# Patient Record
Sex: FEMALE | Race: WHITE | Employment: OTHER | URBAN - METROPOLITAN AREA
[De-identification: names, ages, dates, MRNs, and addresses within clinical notes are randomized per-mention and may not be internally consistent; named-entity substitution may affect disease eponyms.]

---

## 2018-04-07 ENCOUNTER — HOSPITAL ENCOUNTER (OUTPATIENT)
Dept: MRI IMAGING | Age: 68
Discharge: HOME | End: 2018-04-07
Attending: FAMILY MEDICINE
Payer: MEDICARE

## 2018-04-07 DIAGNOSIS — M47.12 OTHER SPONDYLOSIS WITH MYELOPATHY, CERVICAL REGION: ICD-10-CM

## 2018-04-07 PROCEDURE — 72141 MRI NECK SPINE W/O DYE: CPT

## 2022-06-10 RX ORDER — METFORMIN HYDROCHLORIDE 500 MG/1
500 TABLET, EXTENDED RELEASE ORAL 2 TIMES DAILY
COMMUNITY

## 2022-06-16 ENCOUNTER — ANESTHESIA EVENT (OUTPATIENT)
Dept: ENDOSCOPY | Age: 72
End: 2022-06-16
Payer: MEDICARE

## 2022-06-17 ENCOUNTER — HOSPITAL ENCOUNTER (OUTPATIENT)
Age: 72
Setting detail: OUTPATIENT SURGERY
Discharge: HOME | End: 2022-06-17
Attending: INTERNAL MEDICINE | Admitting: INTERNAL MEDICINE
Payer: MEDICARE

## 2022-06-17 ENCOUNTER — ANESTHESIA (OUTPATIENT)
Dept: ENDOSCOPY | Age: 72
End: 2022-06-17
Payer: MEDICARE

## 2022-06-17 VITALS
RESPIRATION RATE: 16 BRPM | WEIGHT: 206 LBS | OXYGEN SATURATION: 98 % | SYSTOLIC BLOOD PRESSURE: 133 MMHG | TEMPERATURE: 98.6 F | HEIGHT: 63 IN | DIASTOLIC BLOOD PRESSURE: 60 MMHG | HEART RATE: 59 BPM | BODY MASS INDEX: 36.5 KG/M2

## 2022-06-17 PROCEDURE — 88305 TISSUE EXAM BY PATHOLOGIST: CPT

## 2022-06-17 PROCEDURE — 76040000007: Performed by: INTERNAL MEDICINE

## 2022-06-17 PROCEDURE — 88342 IMHCHEM/IMCYTCHM 1ST ANTB: CPT

## 2022-06-17 PROCEDURE — 77030021593 HC FCPS BIOP ENDOSC BSC -A: Performed by: INTERNAL MEDICINE

## 2022-06-17 PROCEDURE — 74011000250 HC RX REV CODE- 250: Performed by: ANESTHESIOLOGY

## 2022-06-17 PROCEDURE — 2709999900 HC NON-CHARGEABLE SUPPLY: Performed by: INTERNAL MEDICINE

## 2022-06-17 PROCEDURE — 74011250636 HC RX REV CODE- 250/636: Performed by: ANESTHESIOLOGY

## 2022-06-17 PROCEDURE — 77030038604 HC SNR ENDO EXACTO USEN -B: Performed by: INTERNAL MEDICINE

## 2022-06-17 PROCEDURE — 76060000032 HC ANESTHESIA 0.5 TO 1 HR: Performed by: INTERNAL MEDICINE

## 2022-06-17 RX ORDER — SODIUM CHLORIDE 9 MG/ML
75 INJECTION, SOLUTION INTRAVENOUS CONTINUOUS
Status: DISCONTINUED | OUTPATIENT
Start: 2022-06-17 | End: 2022-06-17 | Stop reason: HOSPADM

## 2022-06-17 RX ORDER — FENTANYL CITRATE 50 UG/ML
12.5-2 INJECTION, SOLUTION INTRAMUSCULAR; INTRAVENOUS
Status: DISCONTINUED | OUTPATIENT
Start: 2022-06-17 | End: 2022-06-17 | Stop reason: HOSPADM

## 2022-06-17 RX ORDER — LIDOCAINE HYDROCHLORIDE 20 MG/ML
INJECTION, SOLUTION EPIDURAL; INFILTRATION; INTRACAUDAL; PERINEURAL AS NEEDED
Status: DISCONTINUED | OUTPATIENT
Start: 2022-06-17 | End: 2022-06-17 | Stop reason: HOSPADM

## 2022-06-17 RX ORDER — MIDAZOLAM HYDROCHLORIDE 1 MG/ML
.25-5 INJECTION, SOLUTION INTRAMUSCULAR; INTRAVENOUS
Status: DISCONTINUED | OUTPATIENT
Start: 2022-06-17 | End: 2022-06-17 | Stop reason: HOSPADM

## 2022-06-17 RX ORDER — OMEPRAZOLE 40 MG/1
40 CAPSULE, DELAYED RELEASE ORAL
Qty: 30 CAPSULE | Refills: 3 | Status: SHIPPED | OUTPATIENT
Start: 2022-06-17

## 2022-06-17 RX ORDER — SODIUM CHLORIDE 9 MG/ML
INJECTION, SOLUTION INTRAVENOUS
Status: DISCONTINUED | OUTPATIENT
Start: 2022-06-17 | End: 2022-06-17 | Stop reason: HOSPADM

## 2022-06-17 RX ORDER — PHENYLEPHRINE HCL IN 0.9% NACL 0.4MG/10ML
SYRINGE (ML) INTRAVENOUS AS NEEDED
Status: DISCONTINUED | OUTPATIENT
Start: 2022-06-17 | End: 2022-06-17 | Stop reason: HOSPADM

## 2022-06-17 RX ORDER — NALOXONE HYDROCHLORIDE 0.4 MG/ML
0.4 INJECTION, SOLUTION INTRAMUSCULAR; INTRAVENOUS; SUBCUTANEOUS
Status: DISCONTINUED | OUTPATIENT
Start: 2022-06-17 | End: 2022-06-17 | Stop reason: HOSPADM

## 2022-06-17 RX ORDER — EPINEPHRINE 0.1 MG/ML
1 INJECTION INTRACARDIAC; INTRAVENOUS
Status: DISCONTINUED | OUTPATIENT
Start: 2022-06-17 | End: 2022-06-17 | Stop reason: HOSPADM

## 2022-06-17 RX ORDER — PROPOFOL 10 MG/ML
INJECTION, EMULSION INTRAVENOUS AS NEEDED
Status: DISCONTINUED | OUTPATIENT
Start: 2022-06-17 | End: 2022-06-17 | Stop reason: HOSPADM

## 2022-06-17 RX ORDER — DEXTROMETHORPHAN/PSEUDOEPHED 2.5-7.5/.8
1.2 DROPS ORAL
Status: DISCONTINUED | OUTPATIENT
Start: 2022-06-17 | End: 2022-06-17 | Stop reason: HOSPADM

## 2022-06-17 RX ORDER — SODIUM CHLORIDE 0.9 % (FLUSH) 0.9 %
5-40 SYRINGE (ML) INJECTION AS NEEDED
Status: DISCONTINUED | OUTPATIENT
Start: 2022-06-17 | End: 2022-06-17 | Stop reason: HOSPADM

## 2022-06-17 RX ORDER — FLUMAZENIL 0.1 MG/ML
0.2 INJECTION INTRAVENOUS
Status: DISCONTINUED | OUTPATIENT
Start: 2022-06-17 | End: 2022-06-17 | Stop reason: HOSPADM

## 2022-06-17 RX ORDER — SODIUM CHLORIDE 0.9 % (FLUSH) 0.9 %
5-40 SYRINGE (ML) INJECTION EVERY 8 HOURS
Status: DISCONTINUED | OUTPATIENT
Start: 2022-06-17 | End: 2022-06-17 | Stop reason: HOSPADM

## 2022-06-17 RX ORDER — ATROPINE SULFATE 0.1 MG/ML
0.5 INJECTION INTRAVENOUS
Status: DISCONTINUED | OUTPATIENT
Start: 2022-06-17 | End: 2022-06-17 | Stop reason: HOSPADM

## 2022-06-17 RX ADMIN — PROPOFOL 80 MG: 10 INJECTION, EMULSION INTRAVENOUS at 14:51

## 2022-06-17 RX ADMIN — Medication 120 MCG: at 14:52

## 2022-06-17 RX ADMIN — PROPOFOL 25 MG: 10 INJECTION, EMULSION INTRAVENOUS at 15:12

## 2022-06-17 RX ADMIN — PROPOFOL 25 MG: 10 INJECTION, EMULSION INTRAVENOUS at 15:00

## 2022-06-17 RX ADMIN — PROPOFOL 25 MG: 10 INJECTION, EMULSION INTRAVENOUS at 15:16

## 2022-06-17 RX ADMIN — LIDOCAINE HYDROCHLORIDE 40 MG: 20 INJECTION, SOLUTION EPIDURAL; INFILTRATION; INTRACAUDAL; PERINEURAL at 14:51

## 2022-06-17 RX ADMIN — PROPOFOL 25 MG: 10 INJECTION, EMULSION INTRAVENOUS at 14:58

## 2022-06-17 RX ADMIN — PROPOFOL 25 MG: 10 INJECTION, EMULSION INTRAVENOUS at 15:04

## 2022-06-17 RX ADMIN — SODIUM CHLORIDE: 9 INJECTION, SOLUTION INTRAVENOUS at 14:48

## 2022-06-17 RX ADMIN — PROPOFOL 25 MG: 10 INJECTION, EMULSION INTRAVENOUS at 14:56

## 2022-06-17 RX ADMIN — PROPOFOL 25 MG: 10 INJECTION, EMULSION INTRAVENOUS at 15:18

## 2022-06-17 RX ADMIN — PROPOFOL 30 MG: 10 INJECTION, EMULSION INTRAVENOUS at 14:54

## 2022-06-17 RX ADMIN — PROPOFOL 40 MG: 10 INJECTION, EMULSION INTRAVENOUS at 14:52

## 2022-06-17 RX ADMIN — PROPOFOL 25 MG: 10 INJECTION, EMULSION INTRAVENOUS at 15:14

## 2022-06-17 RX ADMIN — PROPOFOL 25 MG: 10 INJECTION, EMULSION INTRAVENOUS at 15:09

## 2022-06-17 RX ADMIN — PROPOFOL 25 MG: 10 INJECTION, EMULSION INTRAVENOUS at 15:02

## 2022-06-17 RX ADMIN — PROPOFOL 25 MG: 10 INJECTION, EMULSION INTRAVENOUS at 15:07

## 2022-06-17 RX ADMIN — PROPOFOL 25 MG: 10 INJECTION, EMULSION INTRAVENOUS at 15:06

## 2022-06-17 NOTE — PROCEDURES
[address]  [address]  [phone]               Colonoscopy Operative Report      Indications:  Iron deficiency anemia    :  Barbara Avalos MD    Staff: Endoscopy Technician-1: Penelope Trotter  Endoscopy RN-1: Brad Spann RN     Referring Provider: Jo Ann Patiño MD    Sedation:  MAC anesthesia    Procedure Details:  After informed consent was obtained with all risks and benefits of procedure explained and preoperative exam completed, the patient was taken to the endoscopy suite and placed in the left lateral decubitus position. Upon sequential sedation as per above, a digital rectal exam was performed  And was normal.  The Olympus videocolonoscope  was inserted in the rectum and carefully advanced to the cecum, which was identified by the ileocecal valve and appendiceal orifice, terminal ileum. The quality of preparation was good. The colonoscope was slowly withdrawn with careful evaluation between folds. Retroflexion in the rectum was performed and was normal..     Findings:   Rectum: Grade 2 internal hemorrhoid(s); Sigmoid: normal  Descending Colon: 1  Sessile polyp(s), the largest 7 mm in size;  Transverse Colon: normal  Ascending Colon: 1  Sessile polyp(s), the largest 3 mm in size;  Cecum: normal  Terminal Ileum: normal    Interventions:  2 complete polypectomy were performed using cold snare and cold biopsy forceps and the polyps were  retrieved    Specimen Removed:   ID Type Source Tests Collected by Time Destination   3 : Ascending colon polyp Preservative Colon, Ascending  Poli Goodson MD 6/17/2022 1516 Pathology   4 : Descending colon polyp Preservative Colon, Descending  Poli Goodson MD 6/17/2022 1519 Pathology       EBL:  Minimal    Complications:  None; patient tolerated the procedure well.     Impression:  -Two polyps (3-7 mm) found in the ascending and descending colon, removed and sent for pathology  -Medium sized internal hemorrhoids  -Otherwise normal colon and terminal ileum    Recommendations:   -Resume normal medication(s). -Begin fiber supplement daily   -High fiber diet.  -Await pathology. -If adenoma is present, repeat colonoscopy in 5 years.  -Call my office to schedule follow up in 4-6 months and if persistent anemia will consider capsule endoscopy. Discharge Disposition:  Home in the company of a  when able to ambulate.     Moni Lopez MD  6/17/2022  3:27 PM

## 2022-06-17 NOTE — H&P
[address]  [phone]        History and Physical     NAME: Letty Schroeder   :  1950   MRN:  [id number]         HPI:  Letty Schroeder is a 81 y.o. female here for EGD and colonoscopy for iron deficiency anemia and for colorectal cancer screening. Last colonoscopy . Past Surgical History:   Procedure Laterality Date    HX GI      colonoscopy     Past Medical History:   Diagnosis Date    Arrhythmia     irregular heartbeat/PCP - states it is \"fine\"    Arthritis     Diabetes ([address]     GERD (gastroesophageal reflux disease)     Hypertension      Social History     Tobacco Use    Smoking status: Former Smoker    Smokeless tobacco: Never Used    Tobacco comment: quit 15-20 yrs ago   Vaping Use    Vaping Use: Never used   Substance Use Topics    Alcohol use: Yes     Comment: social    Drug use: Not on file     No current facility-administered medications on file prior to encounter. Current Outpatient Medications on File Prior to Encounter   Medication Sig Dispense Refill    iron/vitamin B complex (GERITOL PO) Take  by mouth as needed. liquid      metFORMIN ER (GLUCOPHAGE XR) 500 mg tablet Take 500 mg by mouth two (2) times a day. Pt reports not always taking it twice a day, but it is prescribed for twice a day.  IRBESARTAN-HYDROCHLOROTHIAZIDE PO Take  by mouth daily. Will bring in strength.        No Known Allergies  Family History   Problem Relation Age of Onset    Diabetes Mother     Hypertension Mother     Diabetes Father     Hypertension Father      Current Facility-Administered Medications   Medication Dose Route Frequency    0.9% sodium chloride infusion  75 mL/hr IntraVENous CONTINUOUS    sodium chloride (NS) flush 5-40 mL  5-40 mL IntraVENous Q8H    sodium chloride (NS) flush 5-40 mL  5-40 mL IntraVENous PRN    midazolam (VERSED) injection 0.25-5 mg  0.25-5 mg IntraVENous Multiple    fentaNYL citrate (PF) injection 12.5-200 mcg  12.5-200 mcg IntraVENous Multiple    naloxone (NARCAN) injection 0.4 mg  0.4 mg IntraVENous Multiple    flumazeniL (ROMAZICON) 0.1 mg/mL injection 0.2 mg  0.2 mg IntraVENous Multiple    simethicone (MYLICON) 28TE/5.6EC oral drops 80 mg  1.2 mL Oral Multiple    atropine injection 0.5 mg  0.5 mg IntraVENous ONCE PRN    EPINEPHrine (ADRENALIN) 0.1 mg/mL syringe 1 mg  1 mg Endoscopically ONCE PRN       PHYSICAL EXAM:    BP (!) 146/64   Pulse 71   Temp 98.6 °F (37 °C)   Resp 17   Ht 5' 3\" (1.6 m)   Wt 93.4 kg (206 lb)   SpO2 100%   Breastfeeding Unknown   BMI 36.49 kg/m²      General: WD, WN. Alert, cooperative, no acute distress    HEENT: NC, Atraumatic. PERRLA, EOMI. Anicteric sclerae. Lungs:  CTA Bilaterally. No Wheezing/Rhonchi/Rales. Heart:  Regular  rhythm,  No murmur, No Rubs, No Gallops  Abdomen: Soft, Non distended, Non tender. +Bowel sounds, no HSM  Extremities: No c/c/e  Neurologic:  CN 2-12 gi, Alert and oriented X 3. No acute neurological distress   Psych:   Good insight. Not anxious nor agitated. Assessment:   · Iron deficiency anemia.  Last colonoscopy 2008    Plan:   · Endoscopic procedure: EGD and colonoscopy  · Anesthesia plan: Monitored Anesthesia Care

## 2022-06-17 NOTE — PROCEDURES
[address]  174 [facility], [address] [place] [address]  [phone]           Endoscopic Gastroduodenoscopy Procedure Note    Shahab Daniels  1950  [id number]    Indication: Iron deficiency anemia     : Delphina Klinefelter, MD    Staff: Endoscopy Technician-1: Mukul Bowen  Endoscopy RN-1: Villa Aguilar RN     Referring Provider:  Lady You MD      Anesthesia/Sedation:  MAC anesthesia      Procedure Details     After infomed consent was obtained for the procedure, with all risks and benefits of procedure explained the patient was taken to the endoscopy suite and placed in the left lateral decubitus position. Following sequential administration of sedation as per above, the endoscope was inserted into the mouth and advanced under direct vision to third portion of the duodenum. A careful inspection was made as the gastroscope was withdrawn, including a retroflexed view of the proximal stomach; findings and interventions are described below. Findings:   Esophagus: Mild esophagitis, otherwise unremarkable exam.  Stomach: 4 cm sliding hiatal hernia with Hill grade 3 flap valve. GE junction and z-line at 36 cm and diaphragmatic hiatus at 40 cm from incisors. Otherwise normal.   Duodenum/jejunum: normal    Therapies:  biopsy of stomach fundus, body, antrum  biopsy of duodenal bulb, second and third part    Specimens:   ID Type Source Tests Collected by Time Destination   1 : duodenal biopsies Preservative Duodenum  Silva Villarreal MD 6/17/2022 3118 Pathology   2 : gastric biopsies Preservative Gastric  Silva Villarreal MD 6/17/2022 5623 Pathology               EBL: Minimal    Complications:  None; patient tolerated the procedure well. Impression:      -4 cm sliding hiatal hernia with mild esophagitis  -Otherwise normal exam - gastric and duodenal biopsies obtained.      Recommendations:  -Acid suppression with omeprazole 40 mg once daily before breakfast for 2-3 months and as needed after that.    -Await pathology.  -Proceed with colonoscopy today    Liz Fernandez MD  6/17/2022  3:01 PM

## 2022-06-17 NOTE — ANESTHESIA POSTPROCEDURE EVALUATION
Procedure(s):  COLONOSCOPY  ESOPHAGOGASTRODUODENOSCOPY (EGD)  ESOPHAGOGASTRODUODENAL (EGD) BIOPSY  ENDOSCOPIC POLYPECTOMY. MAC    Anesthesia Post Evaluation        Patient location during evaluation: PACU  Note status: Adequate. Level of consciousness: responsive to verbal stimuli and sleepy but conscious  Pain management: satisfactory to patient  Airway patency: patent  Anesthetic complications: no  Cardiovascular status: acceptable  Respiratory status: acceptable  Hydration status: acceptable  Comments: +Post-Anesthesia Evaluation and Assessment    Patient: Tammy Olsen MRN: [id number]  SSN: [id number]   YOB: 1950  Age: 81 y.o. Sex: female      Cardiovascular Function/Vital Signs    BP (!) 145/63   Pulse 73   Temp 37 °C (98.6 °F)   Resp 12   Ht 5' 3\" (1.6 m)   Wt 93.4 kg (206 lb)   SpO2 98%   Breastfeeding Unknown   BMI 36.49 kg/m²     Patient is status post Procedure(s):  COLONOSCOPY  ESOPHAGOGASTRODUODENOSCOPY (EGD)  ESOPHAGOGASTRODUODENAL (EGD) BIOPSY  ENDOSCOPIC POLYPECTOMY. Nausea/Vomiting: Controlled. Postoperative hydration reviewed and adequate. Pain:  Pain Scale 1: Numeric (0 - 10) (06/17/22 1429)  Pain Intensity 1: 0 (06/17/22 1429)   Managed. Neurological Status: At baseline. Mental Status and Level of Consciousness: Arousable. Pulmonary Status:   O2 Device: None (06/17/22 1524)   Adequate oxygenation and airway patent. Complications related to anesthesia: None    Post-anesthesia assessment completed. No concerns. Signed By: Jess Morris MD    6/17/2022  Post anesthesia nausea and vomiting:  controlled      INITIAL Post-op Vital signs:   Vitals Value Taken Time   BP     Temp     Pulse 69 06/17/22 1528   Resp 21 06/17/22 1528   SpO2 100 % 06/17/22 1528   Vitals shown include unvalidated device data.

## 2022-06-17 NOTE — PERIOP NOTES

## 2022-06-17 NOTE — DISCHARGE INSTRUCTIONS
[facility].  [address] [place] [address]  [phone]                         DISCHARGE INSTRUCTIONS    Kari Lewis  [id number]  1950    DISCOMFORT:  Redness at IV site- apply warm compress to area; if redness or soreness persist- contact your physician  There may be a slight amount of blood passed from the rectum  Gaseous discomfort- walking, belching will help relieve any discomfort  You may not operate a vehicle for 12 hours  You may not  engage in an occupation involving machinery or appliances for rest of today  You may not  drink alcoholic beverages for at least 12 hours  Avoid making any critical decisions for at least 24 hour    DIET:   GERD/high fiber Diet    ACTIVITY  You may resume your normal daily activities   Spend the remainder of the day resting -  avoid any strenuous activity. CALL M.D. ANY SIGN OF   Increasing pain, nausea, vomiting  Abdominal distension (swelling)  New increased bleeding (oral or rectal)  Fever (chills)  Pain in chest area  Bloody discharge from nose or mouth  Shortness of breath      ENDOSCOPY FINDINGS:    Upper Endoscopy:    Impression:      -4 cm sliding hiatal hernia with mild esophagitis  -Otherwise normal exam - gastric and duodenal biopsies obtained.      Recommendations:  -Acid suppression with omeprazole 40 mg once daily before breakfast for 2-3 months and as needed after that. -Await pathology. Colonoscopy:    Impression:  -Two polyps (3-7 mm) found in the ascending and descending colon, removed and sent for pathology  -Medium sized internal hemorrhoids  -Otherwise normal colon and terminal ileum    Recommendations:   -Resume normal medication(s). -Begin fiber supplement daily   -High fiber diet.  -Await pathology. -If adenoma is present, repeat colonoscopy in 5 years.  -Call my office to schedule follow up in 4-6 months and if persistent anemia will consider capsule endoscopy.      Camron Mccann MD    Follow-up Instructions:   Call Dr. Dell Marquez for any questions or problems. If we took a biopsy please call the office within 2 weeks to discuss your pathology results. Telephone # [phone]    Patient Education        High-Fiber Diet: Care Instructions  Overview     A high-fiber diet may help you relieve constipation and feel less bloated. Your doctor and dietitian will help you make a high-fiber eating plan based on your personal needs. The plan will include the things you like to eat. It will also make sure that you get 25 to 35 grams of fiber a day. Before you make changes to the way you eat, be sure to talk with your doctor or dietitian. Follow-up care is a key part of your treatment and safety. Be sure to make and go to all appointments, and call your doctor if you are having problems. It's also a good idea to know your test results and keep a list of the medicines you take. How can you care for yourself at home? · You can increase how much fiber you get if you eat more of certain foods. These foods include:  ? Whole-grain breads and cereals. ? Fruits, such as pears, apples, and peaches. Eat the skins and peels if you can.  ? Vegetables, such as broccoli, cabbage, spinach, carrots, asparagus, and squash. ? Starchy vegetables. These include potatoes with skins, kidney beans, and lima beans. · Take a fiber supplement every day if your doctor recommends it. Examples are Benefiber, Citrucel, FiberCon, and Metamucil. Ask your doctor how much to take. · Drink plenty of fluids. If you have kidney, heart, or liver disease and have to limit fluids, talk with your doctor before you increase the amount of fluids you drink. Where can you learn more? Go to http://www.gray.com/  Enter F840 in the search box to learn more about \"High-Fiber Diet: Care Instructions. \"  Current as of: September 8, 2021               Content Version: 13.2  © 6872-1139 Healthwise, [employer].    Care instructions adapted under license by [employer] (which disclaims liability or warranty for this information). If you have questions about a medical condition or this instruction, always ask your healthcare professional. Darrin Padilla any warranty or liability for your use of this information. Learning About Foods That Are Good Sources of Fiber  What foods are high in fiber? The foods you eat contain nutrients, such as vitamins and minerals. Fiber is a nutrient. Your body needs the right amount to stay healthy and work as it should. You can use the list below to help you make choices about which foods to eat. Here are some examples of foods that are good sources of fiber. Fruits  · Apple  · Apricot  · Avocado  · Banana  · Blackberries  · Cherries  · Melon  · Pear  · Raspberries  Grains  · Amaranth  · Barley  · Bran cereal  · Farro  · Oat bran  · Oatmeal  · Quinoa  · Rice (brown or wild)  · Whole-grain bread  · Whole-grain English muffin  Protein foods  · Almonds  · Beans (black, kidney, navy, park)  · Jareth seeds  · Garbanzo beans  · Lentils  · Pumpkin seeds  · Split peas  · Sunflower seeds  Vegetables  · Artichoke  · Broccoli  · [place] sprouts  · Cabbage  · Carrots  · Cauliflower  · Eggplant  · Green peas  · Kale  · Pumpkin  · Sweet potato  · White potato  Work with your doctor to find out how much of this nutrient you need. Depending on your health, you may need more or less of it in your diet. Where can you learn more? Go to http://www.gray.com/  Enter F355 in the search box to learn more about \"Learning About Foods That Are Good Sources of Fiber. \"  Current as of: September 8, 2021               Content Version: 13.2  © 7327-7846 Healthwise, [employer]. Care instructions adapted under license by [employer] (which disclaims liability or warranty for this information).  If you have questions about a medical condition or this instruction, always ask your healthcare professional. [address] any warranty or liability for your use of this information. Esophagitis: Care Instructions  Your Care Instructions     Esophagitis (say \"ih-sof-uh-JY-tus\") is irritation of the esophagus, the tube that carries food from your throat to your stomach. Acid reflux is the most common cause of this condition. When you have reflux, stomach acid and juices flow upward. This can cause pain or a burning feeling in your chest. You may have a sore throat. It may be hard to swallow. Other causes of this condition include some medicines and supplements. Allergies or an infection can also cause it. Your doctor will ask about your symptoms and past health. He or she might do tests to find the cause of your symptoms. Treatment depends on what is causing the problem. Treatment might include changing your diet or taking medicine to relieve your symptoms. It might also include changing a medicine that is causing your symptoms. If you have reflux, medicine that reduces the stomach acid helps your body heal. It might take 1 to 3 weeks to heal.  Follow-up care is a key part of your treatment and safety. Be sure to make and go to all appointments, and call your doctor if you are having problems. It's also a good idea to know your test results and keep a list of the medicines you take. How can you care for yourself at home? · If you have acid reflux, your doctor may recommend that you:  ? Eat several small meals instead of two or three large meals. After you eat, wait 2 to 3 hours before you lie down. ? Avoid chocolate, mint, alcohol, and spicy foods. ? Don't smoke or use smokeless tobacco. Smoking can make this condition worse. If you need help quitting, talk to your doctor about stop-smoking programs and medicines. These can increase your chances of quitting for good.   ? Raise the head of your bed 6 to 8 inches if you have symptoms at night. ? Lose weight if you are overweight. ? Take an over-the-counter antacid, such as Maalox, Mylanta, or Tums. Be careful when you take over-the-counter antacid medicines. Many of these medicines have aspirin in them. Read the label to make sure that you are not taking more than the recommended dose. Too much aspirin can be harmful. ? Take stronger acid reducers. Examples are famotidine (such as Pepcid) and omeprazole (such as Prilosec). · If your condition is caused by infection, allergy, or other problems, use the medicine or treatments that your doctor recommends. · Be safe with medicines. Take your medicines exactly as prescribed. Call your doctor if you think you are having a problem with your medicine. When should you call for help? Call your doctor now or seek immediate medical care if:    · You have new or worse belly pain.     · You are vomiting. Watch closely for changes in your health, and be sure to contact your doctor if:    · You have new or worse symptoms of reflux.     · You have trouble or pain swallowing.     · You are losing weight.     · You do not get better as expected. Where can you learn more? Go to http://www.gray.com/  Enter N977 in the search box to learn more about \"Esophagitis: Care Instructions. \"  Current as of: September 8, 2021               Content Version: 13.2  © 8078-0818 Healthwise, Inc[employer]. Care instructions adapted under license by [employer] (which disclaims liability or warranty for this information). If you have questions about a medical condition or this instruction, always ask your healthcare professional. [place] [address] any warranty or liability for your use of this information. Hemorrhoids: Care Instructions  Overview     Hemorrhoids are swollen veins that develop in the anal canal. Bleeding during bowel movements, itching, and rectal pain are the most common symptoms. Hemorrhoids can be uncomfortable at times, but rarely are they a serious problem. Most of the time, you can treat them with simple changes to your diet and bowel habits. These changes include eating more fiber and not straining to pass stools. Most hemorrhoids don't need surgery or other treatment unless they are very large and painful or bleed a lot. Follow-up care is a key part of your treatment and safety. Be sure to make and go to all appointments, and call your doctor if you are having problems. It's also a good idea to know your test results and keep a list of the medicines you take. How can you care for yourself at home? · Sit in a few inches of warm water (sitz bath) 3 times a day and after bowel movements. The warm water helps with pain and itching. · Put ice on your anal area several times a day for 10 minutes at a time. Put a thin cloth between the ice and your skin. Follow this by placing a warm, wet towel on the area for another 10 to 20 minutes. · Take pain medicines exactly as directed. ? If the doctor gave you a prescription medicine for pain, take it as prescribed. ? If you are not taking a prescription pain medicine, ask your doctor if you can take an over-the-counter medicine. · Keep the anal area clean, but be gentle. Use water and a fragrance-free soap, or use baby wipes or medicated pads such as Tucks. · Wear cotton underwear and loose clothing to decrease moisture in the anal area. · Eat more fiber. Include foods such as whole-grain breads and cereals, raw vegetables, raw and dried fruits, and beans. · Drink plenty of fluids. If you have kidney, heart, or liver disease and have to limit fluids, talk with your doctor before you increase the amount of fluids you drink. · Use a stool softener that contains bran or psyllium. You can save money by buying bran or psyllium (available in bulk at most health food stores) and sprinkling it on foods or stirring it into fruit juice.  Or you can use a product such as Metamucil or Hydrocil. · Practice healthy bowel habits. ? Go to the bathroom as soon as you have the urge. ? Avoid straining to pass stools. Relax and give yourself time to let things happen naturally. ? Do not hold your breath while passing stools. ? Do not read while sitting on the toilet. Get off the toilet as soon as you have finished. · Take your medicines exactly as prescribed. Call your doctor if you think you are having a problem with your medicine. When should you call for help? Call 911 anytime you think you may need emergency care. For example, call if:    · You pass maroon or very bloody stools. Call your doctor now or seek immediate medical care if:    · You have increased pain.     · You have increased bleeding. Watch closely for changes in your health, and be sure to contact your doctor if:    · Your symptoms have not improved after 3 or 4 days. Where can you learn more? Go to http://www.mcgill.com/  Enter F228 in the search box to learn more about \"Hemorrhoids: Care Instructions. \"  Current as of: September 8, 2021               Content Version: 13.2  © 1819-4498 [employer] (PRN). Care instructions adapted under license by [employer] (which disclaims liability or warranty for this information). If you have questions about a medical condition or this instruction, always ask your healthcare professional. [place] [address] any warranty or liability for your use of this information. Hiatal Hernia: Care Instructions  Your Care Instructions  A hiatal hernia occurs when part of the stomach bulges into the chest cavity. A hiatal hernia may allow stomach acid and juices to back up into the esophagus (acid reflux). This can cause a feeling of burning, warmth, heat, or pain behind the breastbone.  This feeling may often occur after you eat, soon after you lie down, or when you bend forward, and it may come and go. You also may have a sour taste in your mouth. These symptoms are commonly known as heartburn or reflux. But not all hiatal hernias cause symptoms. Follow-up care is a key part of your treatment and safety. Be sure to make and go to all appointments, and call your doctor if you are having problems. It's also a good idea to know your test results and keep a list of the medicines you take. How can you care for yourself at home? · Take your medicines exactly as prescribed. Call your doctor if you think you are having a problem with your medicine. · Do not take aspirin or other nonsteroidal anti-inflammatory drugs (NSAIDs), such as ibuprofen (Advil, Motrin) or naproxen (Aleve), unless your doctor says it is okay. Ask your doctor what you can take for pain. · Your doctor may recommend over-the-counter medicine. For mild or occasional indigestion, antacids such as Tums, Gaviscon, Maalox, or Mylanta may help. Your doctor also may recommend over-the-counter acid reducers, such as famotidine (Pepcid AC), cimetidine (Tagamet HB), or omeprazole (Prilosec). Read and follow all instructions on the label. If you use these medicines often, talk with your doctor. · Change your eating habits. ? It's best to eat several small meals instead of two or three large meals. ? After you eat, wait 2 to 3 hours before you lie down. Late-night snacks aren't a good idea. ? Avoid foods that make your symptoms worse. These may include chocolate, mint, alcohol, pepper, spicy foods, high-fat foods, or drinks with caffeine in them, such as tea, coffee, ashley, or energy drinks. If your symptoms are worse after you eat a certain food, you may want to stop eating it to see if your symptoms get better. · Do not smoke or chew tobacco.  · If you get heartburn at night, raise the head of your bed 6 to 8 inches by putting the frame on blocks or placing a foam wedge under the head of your mattress.  (Adding extra pillows does not work.)  · Do not wear tight clothing around your middle. · Lose weight if you need to. Losing just 5 to 10 pounds can help. When should you call for help? Call your doctor now or seek immediate medical care if:    · You have new or worse belly pain.     · You are vomiting. Watch closely for changes in your health, and be sure to contact your doctor if:    · You have new or worse symptoms of indigestion.     · You have trouble or pain swallowing.     · You are losing weight.     · You do not get better as expected. Where can you learn more? Go to http://www.mcgill.com/  Enter T074 in the search box to learn more about \"Hiatal Hernia: Care Instructions. \"  Current as of: September 8, 2021               Content Version: 13.2  © 4405-7762 [employer]. Care instructions adapted under license by [employer] (which disclaims liability or warranty for this information). If you have questions about a medical condition or this instruction, always ask your healthcare professional. [place] [address] any warranty or liability for your use of this information. Learning About Coronavirus [phone])  Coronavirus [phone]): Overview  What is coronavirus (COVID-19)? The coronavirus disease (COVID-19) is caused by a virus. It is an illness that was first found in [place], [place], in December 2019. It has since spread worldwide. The virus can cause fever, cough, and trouble breathing. In severe cases, it can cause pneumonia and make it hard to breathe without help. It can cause death. Coronaviruses are a large group of viruses. They cause the common cold. They also cause more serious illnesses like Middle East respiratory syndrome (MERS) and severe acute respiratory syndrome (SARS). COVID-19 is caused by a novel coronavirus. That means it's a new type that has not been seen in people before.   This virus spreads person-to-person through droplets from coughing and sneezing. It can also spread when you are close to someone who is infected. And it can spread when you touch something that has the virus on it, such as a doorknob or a tabletop. What can you do to protect yourself from coronavirus (COVID-19)? The best way to protect yourself from getting sick is to:  · Avoid areas where there is an outbreak. · Avoid contact with people who may be infected. · Wash your hands often with soap or alcohol-based hand sanitizers. · Avoid crowds and try to stay at least 6 feet away from other people. · Wash your hands often, especially after you cough or sneeze. Use soap and water, and scrub for at least 20 seconds. If soap and water aren't available, use an alcohol-based hand . · Avoid touching your mouth, nose, and eyes. What can you do to avoid spreading the virus to others? To help avoid spreading the virus to others:  · Cover your mouth with a tissue when you cough or sneeze. Then throw the tissue in the trash. · Use a disinfectant to clean things that you touch often. · Stay home if you are sick or have been exposed to the virus. Don't go to school, work, or public areas. And don't use public transportation. · If you are sick:  ? Leave your home only if you need to get medical care. But call the doctor's office first so they know you're coming. And wear a face mask, if you have one.  ? If you have a face mask, wear it whenever you're around other people. It can help stop the spread of the virus when you cough or sneeze. ? Clean and disinfect your home every day. Use household  and disinfectant wipes or sprays. Take special care to clean things that you grab with your hands. These include doorknobs, remote controls, phones, and handles on your refrigerator and microwave. And don't forget countertops, tabletops, bathrooms, and computer keyboards. When to call for help  Call 911 anytime you think you may need emergency care.  For example, call if:  · You have severe trouble breathing. (You can't talk at all.)  · You have constant chest pain or pressure. · You are severely dizzy or lightheaded. · You are confused or can't think clearly. · Your face and lips have a blue color. · You pass out (lose consciousness) or are very hard to wake up. Call your doctor now if you develop symptoms such as:  · Shortness of breath. · Fever. · Cough. If you need to get care, call ahead to the doctor's office for instructions before you go. Make sure you wear a face mask, if you have one, to prevent exposing other people to the virus. Where can you get the latest information? The following health organizations are tracking and studying this virus. Their websites contain the most up-to-date information. Terri Costello also learn what to do if you think you may have been exposed to the virus. · U.S. Centers for Disease Control and Prevention (CDC): The CDC provides updated news about the disease and travel advice. The website also tells you how to prevent the spread of infection. www.cdc.gov  · World Health Organization [facility]): WHO offers information about the virus outbreaks. WHO also has travel advice. www.who.int  Current as of: April 1, 2020               Content Version: 12.4  © 1185-8352 Healthwise, Incorporated. Care instructions adapted under license by your healthcare professional. If you have questions about a medical condition or this instruction, always ask your healthcare professional. [address] any warranty or liability for your use of this information.

## 2023-05-25 RX ORDER — METFORMIN HYDROCHLORIDE 500 MG/1
500 TABLET, EXTENDED RELEASE ORAL 2 TIMES DAILY
COMMUNITY

## 2023-05-25 RX ORDER — OMEPRAZOLE 40 MG/1
40 CAPSULE, DELAYED RELEASE ORAL
COMMUNITY
Start: 2022-06-17

## 2023-12-05 NOTE — ANESTHESIA PREPROCEDURE EVALUATION
Group Topic: [employer]    Date: 12/5/2023  Start Time: 0930  End Time: 1015  Facilitators: Ai Guevara    Focus: Recreational Therapy- Independent Healthy Leisure  Number in attendance: 13'    Pt was invited to participate in independent healthy leisure group by engaging in a relaxing activity such as reading a book, journaling, drawing, or putting together a puzzle. Benefits of independent healthy leisure may include reduction of stress, relaxation, distraction, and overall satisfaction with life.    Method: Group  Attendance: Present  Participation: Active  Patient Response: Appropriate feedback and Attentive  Mood: Normal  Affect: Type: Euthymic (normal mood)             Range: Full (normal)             Congruency: Congruent             Stability: Stable  Behavior/Socialization: Appropriate to group and Cooperative  Thought Process: Focused  Task Performance: Follows directions  Patient Evaluation: Full Participation     Pt actively engaged in an independent healthy leisure activity during group. Pt remained appropriate and focused.  Ai Guevara, JOSE CARLOSS         Relevant Problems   No relevant active problems       Anesthetic History   No history of anesthetic complications            Review of Systems / Medical History  Patient summary reviewed, nursing notes reviewed and pertinent labs reviewed    Pulmonary                Comments: Former Smoker   Neuro/Psych   Within defined limits           Cardiovascular    Hypertension        Dysrhythmias       Exercise tolerance: >4 METS  Comments: No ECG on file   GI/Hepatic/Renal     GERD          Comments: Special screening for malignant neoplasms, colon (6/17/22) Endo/Other    Diabetes: type 2    Obesity and arthritis     Other Findings            Physical Exam    Airway  Mallampati: III  TM Distance: > 6 cm  Neck ROM: normal range of motion   Mouth opening: Normal     Cardiovascular  Regular rate and rhythm,  S1 and S2 normal,  no murmur, click, rub, or gallop             Dental    Dentition: Poor dentition  Comments: Multiple missing teeth, none loose.    Pulmonary  Breath sounds clear to auscultation               Abdominal  GI exam deferred       Other Findings            Anesthetic Plan    ASA: 2  Anesthesia type: MAC          Induction: Intravenous  Anesthetic plan and risks discussed with: Patient

## 2024-06-08 ENCOUNTER — HOSPITAL ENCOUNTER (EMERGENCY)
Facility: HOSPITAL | Age: 74
Discharge: HOME | End: 2024-06-08
Attending: STUDENT IN AN ORGANIZED HEALTH CARE EDUCATION/TRAINING PROGRAM
Payer: MEDICARE

## 2024-06-08 VITALS
HEART RATE: 85 BPM | HEIGHT: 63 IN | DIASTOLIC BLOOD PRESSURE: 63 MMHG | OXYGEN SATURATION: 99 % | TEMPERATURE: 98.5 F | WEIGHT: 198.41 LBS | BODY MASS INDEX: 35.16 KG/M2 | SYSTOLIC BLOOD PRESSURE: 140 MMHG | RESPIRATION RATE: 16 BRPM

## 2024-06-08 DIAGNOSIS — M25.50 POLYARTHRALGIA: Primary | ICD-10-CM

## 2024-06-08 LAB
ALBUMIN SERPL-MCNC: 3.4 G/DL (ref 3.5–5)
ALBUMIN/GLOB SERPL: 0.7 (ref 1.1–2.2)
ALP SERPL-CCNC: 110 U/L (ref 45–117)
ALT SERPL-CCNC: 23 U/L (ref 12–78)
AMMONIA PLAS-SCNC: 11 UMOL/L
ANION GAP SERPL CALC-SCNC: 8 MMOL/L (ref 5–15)
APAP SERPL-MCNC: <2 UG/ML (ref 10–30)
AST SERPL-CCNC: 15 U/L (ref 15–37)
BASOPHILS # BLD: 0 K/UL (ref 0–0.1)
BASOPHILS NFR BLD: 0 % (ref 0–1)
BILIRUB SERPL-MCNC: 0.5 MG/DL (ref 0.2–1)
BUN SERPL-MCNC: 13 MG/DL (ref 6–20)
BUN/CREAT SERPL: 13 (ref 12–20)
CALCIUM SERPL-MCNC: 9 MG/DL (ref 8.5–10.1)
CHLORIDE SERPL-SCNC: 106 MMOL/L (ref 97–108)
CO2 SERPL-SCNC: 28 MMOL/L (ref 21–32)
CREAT SERPL-MCNC: 0.98 MG/DL (ref 0.55–1.02)
DIFFERENTIAL METHOD BLD: ABNORMAL
EOSINOPHIL # BLD: 0.1 K/UL (ref 0–0.4)
EOSINOPHIL NFR BLD: 1 % (ref 0–7)
ERYTHROCYTE [DISTWIDTH] IN BLOOD BY AUTOMATED COUNT: 13.2 % (ref 11.5–14.5)
GLOBULIN SER CALC-MCNC: 4.7 G/DL (ref 2–4)
GLUCOSE SERPL-MCNC: 123 MG/DL (ref 65–100)
HCO3 BLDV-SCNC: 25.4 MMOL/L (ref 23–28)
HCT VFR BLD AUTO: 31.6 % (ref 35–47)
HGB BLD-MCNC: 10.4 G/DL (ref 11.5–16)
IMM GRANULOCYTES # BLD AUTO: 0 K/UL (ref 0–0.04)
IMM GRANULOCYTES NFR BLD AUTO: 0 % (ref 0–0.5)
INR PPP: 1 (ref 0.9–1.1)
LYMPHOCYTES # BLD: 1.7 K/UL (ref 0.8–3.5)
LYMPHOCYTES NFR BLD: 27 % (ref 12–49)
MCH RBC QN AUTO: 26.8 PG (ref 26–34)
MCHC RBC AUTO-ENTMCNC: 32.9 G/DL (ref 30–36.5)
MCV RBC AUTO: 81.4 FL (ref 80–99)
MONOCYTES # BLD: 0.4 K/UL (ref 0–1)
MONOCYTES NFR BLD: 6 % (ref 5–13)
NEUTS SEG # BLD: 4.2 K/UL (ref 1.8–8)
NEUTS SEG NFR BLD: 66 % (ref 32–75)
NRBC # BLD: 0 K/UL (ref 0–0.01)
NRBC BLD-RTO: 0 PER 100 WBC
PCO2 BLDV: 41 MMHG (ref 41–51)
PH BLDV: 7.4 (ref 7.32–7.42)
PLATELET # BLD AUTO: 283 K/UL (ref 150–400)
PMV BLD AUTO: 10.6 FL (ref 8.9–12.9)
PO2 BLDV: 34 MMHG (ref 25–40)
POTASSIUM SERPL-SCNC: 4 MMOL/L (ref 3.5–5.1)
PROT SERPL-MCNC: 8.1 G/DL (ref 6.4–8.2)
PROTHROMBIN TIME: 10.1 SEC (ref 9–11.1)
RBC # BLD AUTO: 3.88 M/UL (ref 3.8–5.2)
SALICYLATES SERPL-MCNC: <1.7 MG/DL (ref 2.8–20)
SAO2 % BLDV: 64.6 % (ref 65–88)
SERVICE CMNT-IMP: ABNORMAL
SODIUM SERPL-SCNC: 142 MMOL/L (ref 136–145)
SPECIMEN TYPE: ABNORMAL
WBC # BLD AUTO: 6.3 K/UL (ref 3.6–11)

## 2024-06-08 PROCEDURE — 82140 ASSAY OF AMMONIA: CPT

## 2024-06-08 PROCEDURE — 6370000000 HC RX 637 (ALT 250 FOR IP): Performed by: STUDENT IN AN ORGANIZED HEALTH CARE EDUCATION/TRAINING PROGRAM

## 2024-06-08 PROCEDURE — 82803 BLOOD GASES ANY COMBINATION: CPT

## 2024-06-08 PROCEDURE — 80179 DRUG ASSAY SALICYLATE: CPT

## 2024-06-08 PROCEDURE — 85610 PROTHROMBIN TIME: CPT

## 2024-06-08 PROCEDURE — 85025 COMPLETE CBC W/AUTO DIFF WBC: CPT

## 2024-06-08 PROCEDURE — 36415 COLL VENOUS BLD VENIPUNCTURE: CPT

## 2024-06-08 PROCEDURE — 80143 DRUG ASSAY ACETAMINOPHEN: CPT

## 2024-06-08 PROCEDURE — 80053 COMPREHEN METABOLIC PANEL: CPT

## 2024-06-08 PROCEDURE — 99283 EMERGENCY DEPT VISIT LOW MDM: CPT

## 2024-06-08 RX ORDER — OXYCODONE HYDROCHLORIDE 5 MG/1
5 TABLET ORAL
Status: COMPLETED | OUTPATIENT
Start: 2024-06-08 | End: 2024-06-08

## 2024-06-08 RX ORDER — LIDOCAINE 4 G/G
1 PATCH TOPICAL DAILY
Status: DISCONTINUED | OUTPATIENT
Start: 2024-06-08 | End: 2024-06-08 | Stop reason: HOSPADM

## 2024-06-08 RX ADMIN — OXYCODONE HYDROCHLORIDE 5 MG: 5 TABLET ORAL at 10:31

## 2024-06-08 ASSESSMENT — PAIN DESCRIPTION - LOCATION: LOCATION: GENERALIZED

## 2024-06-08 ASSESSMENT — PAIN DESCRIPTION - PAIN TYPE: TYPE: CHRONIC PAIN

## 2024-06-08 ASSESSMENT — PAIN - FUNCTIONAL ASSESSMENT: PAIN_FUNCTIONAL_ASSESSMENT: 0-10

## 2024-06-08 ASSESSMENT — PAIN DESCRIPTION - DESCRIPTORS: DESCRIPTORS: ACHING

## 2024-06-08 ASSESSMENT — PAIN SCALES - GENERAL: PAINLEVEL_OUTOF10: 8

## 2024-06-08 NOTE — ED PROVIDER NOTES
DIAGNOSTIC RESULTS     EKG: All EKG's are interpreted by the Emergency Department Physician who either signs or Co-signs this chart in the absence of a cardiologist.        RADIOLOGY:   Non-plain film images such as CT, Ultrasound and MRI are read by the radiologist. Plain radiographic images are visualized and preliminarily interpreted by the emergency physician with the below findings:        Interpretation per the Radiologist below, if available at the time of this note:    No orders to display        LABS:  Labs Reviewed   CBC WITH AUTO DIFFERENTIAL   COMPREHENSIVE METABOLIC PANEL   SALICYLATE LEVEL   ACETAMINOPHEN LEVEL       All other labs were within normal range or not returned as of this dictation.    EMERGENCY DEPARTMENT COURSE and DIFFERENTIAL DIAGNOSIS/MDM:   Vitals:    Vitals:    06/08/24 0940   BP: (!) 173/71   Pulse: 85   Resp: 16   Temp: 98.5 °F (36.9 °C)   TempSrc: Oral   SpO2: 97%   Weight: 90 kg (198 lb 6.6 oz)   Height: 1.6 m (5' 3\")           Medical Decision Making  Amount and/or Complexity of Data Reviewed  Labs: ordered.    Risk  OTC drugs.  Prescription drug management.      Given history, exam and workup patient likely has R shoulder overuse injury with reported history of rotator cuff tear without prior injury. I have low suspicion for fracture, dislocation, significant ligamentous injury, septic arthritis, gout flare, new autoimmune arthropathy, or gonococcal arthropathy.  Imaging was otherwise normal.  Patient was made aware of these findings and recommended RICE for outpatient management and only follow-up with orthopedics as necessary no improvement of symptoms within the next 3 to 5 days.     Labs obtained with concern for chronic acetaminophen and Aleve overdose.       REASSESSMENT   MEDICATIONS GIVEN:   Medications   lidocaine 4 % external patch 1 patch (has no administration in time range)   oxyCODONE (ROXICODONE) immediate release tablet 5 mg (5 mg Oral Given 6/8/24 1031)    Pulse:       Resp:       Temp:       TempSrc:       SpO2: 97% 98% 98% 99%   Weight:       Height:               Medical Decision Making  Amount and/or Complexity of Data Reviewed  Labs: ordered.    Risk  OTC drugs.  Prescription drug management.      Given history, exam and workup patient likely has R shoulder overuse injury with reported history of rotator cuff tear.  Denies any recent traumatic injuries in the last 4-5 years. I have low suspicion for fracture, dislocation, significant ligamentous injury, septic arthritis, gout flare, new autoimmune arthropathy, or gonococcal arthropathy.     Concerned for chronic acetaminophen and NSAID overdose as patient reports she has been taking Tylenol and Aleve Q4h for approximately 1-2 months. Discussed with Poison Control who recommended obtaining above labs for further evaluation of possible chronic toxicity.     Labs were reassuring.  Patient was made aware of these findings and recommended rest and ice for outpatient management, along with appropriate dosing of analgesics. Suspect possible underlying inflammatory arthritis based on history along with known prior right rotator cuff injury. Patient encouraged to arrange follow up with PCP, orthopedics, and rheumatology for further workup and management. Return precautions provided.       REASSESSMENT   MEDICATIONS GIVEN:   Medications   oxyCODONE (ROXICODONE) immediate release tablet 5 mg (5 mg Oral Given 6/8/24 1031)       ED Course as of 06/10/24 1139   Sat Jun 08, 2024   1034 Discussed with Poison Control with concern for chronic acetaminophen overdose. Recommend obtaining PT/INR, VBG, ammonia in addition to CBC, CMP, and APAP/ASA levels.  [LT]      ED Course User Index  [LT] Ariadna Greco MD           CONSULTS:  None    PROCEDURES:  Unless otherwise noted below, none     Procedures      FINAL IMPRESSION      1. Polyarthralgia          DISPOSITION/PLAN   DISPOSITION Decision To Discharge 06/08/2024

## 2024-06-08 NOTE — DISCHARGE INSTRUCTIONS
You have been evaluated in the Emergency Department today for joint pains. Your evaluation did not find evidence of medical conditions requiring emergent intervention at this time.    You may resume normal activities as tolerated.  As we discussed, call to schedule follow-up with orthopedics for your right shoulder and with rheumatology for your other widespread joint pain.    You may take 600mg ibuprofen every 6 hours or tylenol 650mg every 6 hours as needed for pain. If needed, you can alternate these medications so that you take one medication every 3 hours. For instance, at noon take ibuprofen, then at 3pm take tylenol, then at 6pm take ibuprofen.    Please schedule an appointment for follow up with your primary care physician this week.    Return to the Emergency Department if you experience worsening pain, difficulty bearing weight on your leg, leg swelling, numbness, tingling, discoloration, or any other concerning symptoms.    Thank you for choosing us for your care.

## 2024-06-08 NOTE — ED TRIAGE NOTES
Patient presents with generalized body stiffness she states has been going on for \"about 2 months\". Reports generalized body pain but mostly in R shoulder. Pt states when she sits too long she gets stiff and that's when the pain really starts. Pt states she takes tylenol, aleve, and lidocaine patches but has not taken those today.   Patient states she has torn ligaments in R shoulder diagnosed a few years ago at [facility].

## 2024-06-08 NOTE — ED NOTES
Patient discharged to home at this time. All discharge instructions provided to patient. All questions answered. IV removed. No distress noted at time of discharge.

## 2024-10-11 ENCOUNTER — OFFICE VISIT (OUTPATIENT)
Age: 74
End: 2024-10-11
Payer: MEDICARE

## 2024-10-11 VITALS
OXYGEN SATURATION: 98 % | SYSTOLIC BLOOD PRESSURE: 134 MMHG | BODY MASS INDEX: 35.9 KG/M2 | HEIGHT: 63 IN | HEART RATE: 71 BPM | RESPIRATION RATE: 14 BRPM | DIASTOLIC BLOOD PRESSURE: 78 MMHG | WEIGHT: 202.6 LBS

## 2024-10-11 DIAGNOSIS — E11.9 TYPE 2 DIABETES MELLITUS WITHOUT COMPLICATION, WITHOUT LONG-TERM CURRENT USE OF INSULIN (HCC): ICD-10-CM

## 2024-10-11 DIAGNOSIS — I10 PRIMARY HYPERTENSION: ICD-10-CM

## 2024-10-11 DIAGNOSIS — G89.29 CHRONIC MIDLINE LOW BACK PAIN WITHOUT SCIATICA: ICD-10-CM

## 2024-10-11 DIAGNOSIS — M54.50 CHRONIC MIDLINE LOW BACK PAIN WITHOUT SCIATICA: ICD-10-CM

## 2024-10-11 DIAGNOSIS — E11.9 TYPE 2 DIABETES MELLITUS WITHOUT COMPLICATION, WITHOUT LONG-TERM CURRENT USE OF INSULIN (HCC): Primary | ICD-10-CM

## 2024-10-11 PROBLEM — M19.011 PRIMARY OSTEOARTHRITIS OF RIGHT SHOULDER: Status: ACTIVE | Noted: 2024-10-11

## 2024-10-11 PROBLEM — K21.9 GERD (GASTROESOPHAGEAL REFLUX DISEASE): Status: ACTIVE | Noted: 2024-10-11

## 2024-10-11 LAB
ALBUMIN SERPL-MCNC: 3.7 G/DL (ref 3.5–5)
ALBUMIN/GLOB SERPL: 1.1 (ref 1.1–2.2)
ALP SERPL-CCNC: 88 U/L (ref 45–117)
ALT SERPL-CCNC: 12 U/L (ref 12–78)
ANION GAP SERPL CALC-SCNC: 3 MMOL/L (ref 2–12)
AST SERPL-CCNC: 12 U/L (ref 15–37)
BASOPHILS # BLD: 0 K/UL (ref 0–0.1)
BASOPHILS NFR BLD: 1 % (ref 0–1)
BILIRUB SERPL-MCNC: 0.5 MG/DL (ref 0.2–1)
BUN SERPL-MCNC: 13 MG/DL (ref 6–20)
BUN/CREAT SERPL: 16 (ref 12–20)
CALCIUM SERPL-MCNC: 9.2 MG/DL (ref 8.5–10.1)
CHLORIDE SERPL-SCNC: 109 MMOL/L (ref 97–108)
CHOLEST SERPL-MCNC: 170 MG/DL
CO2 SERPL-SCNC: 30 MMOL/L (ref 21–32)
CREAT SERPL-MCNC: 0.8 MG/DL (ref 0.55–1.02)
CREAT UR-MCNC: 61.4 MG/DL
DIFFERENTIAL METHOD BLD: ABNORMAL
EOSINOPHIL # BLD: 0.2 K/UL (ref 0–0.4)
EOSINOPHIL NFR BLD: 3 % (ref 0–7)
ERYTHROCYTE [DISTWIDTH] IN BLOOD BY AUTOMATED COUNT: 13.9 % (ref 11.5–14.5)
EST. AVERAGE GLUCOSE BLD GHB EST-MCNC: 126 MG/DL
GLOBULIN SER CALC-MCNC: 3.4 G/DL (ref 2–4)
GLUCOSE SERPL-MCNC: 108 MG/DL (ref 65–100)
HBA1C MFR BLD: 6 % (ref 4–5.6)
HCT VFR BLD AUTO: 32.2 % (ref 35–47)
HDLC SERPL-MCNC: 59 MG/DL
HDLC SERPL: 2.9 (ref 0–5)
HGB BLD-MCNC: 10.4 G/DL (ref 11.5–16)
IMM GRANULOCYTES # BLD AUTO: 0 K/UL (ref 0–0.04)
IMM GRANULOCYTES NFR BLD AUTO: 0 % (ref 0–0.5)
LDLC SERPL CALC-MCNC: 93.6 MG/DL (ref 0–100)
LYMPHOCYTES # BLD: 1.9 K/UL (ref 0.8–3.5)
LYMPHOCYTES NFR BLD: 36 % (ref 12–49)
MCH RBC QN AUTO: 27.5 PG (ref 26–34)
MCHC RBC AUTO-ENTMCNC: 32.3 G/DL (ref 30–36.5)
MCV RBC AUTO: 85.2 FL (ref 80–99)
MICROALBUMIN UR-MCNC: <0.5 MG/DL
MICROALBUMIN/CREAT UR-RTO: <8 MG/G (ref 0–30)
MONOCYTES # BLD: 0.4 K/UL (ref 0–1)
MONOCYTES NFR BLD: 8 % (ref 5–13)
NEUTS SEG # BLD: 2.8 K/UL (ref 1.8–8)
NEUTS SEG NFR BLD: 52 % (ref 32–75)
NRBC # BLD: 0 K/UL (ref 0–0.01)
NRBC BLD-RTO: 0 PER 100 WBC
PLATELET # BLD AUTO: 205 K/UL (ref 150–400)
PMV BLD AUTO: 12.6 FL (ref 8.9–12.9)
POTASSIUM SERPL-SCNC: 4.1 MMOL/L (ref 3.5–5.1)
PROT SERPL-MCNC: 7.1 G/DL (ref 6.4–8.2)
RBC # BLD AUTO: 3.78 M/UL (ref 3.8–5.2)
SODIUM SERPL-SCNC: 142 MMOL/L (ref 136–145)
SPECIMEN HOLD: NORMAL
TRIGL SERPL-MCNC: 87 MG/DL
VLDLC SERPL CALC-MCNC: 17.4 MG/DL
WBC # BLD AUTO: 5.4 K/UL (ref 3.6–11)

## 2024-10-11 PROCEDURE — 3078F DIAST BP <80 MM HG: CPT | Performed by: INTERNAL MEDICINE

## 2024-10-11 PROCEDURE — 99204 OFFICE O/P NEW MOD 45 MIN: CPT | Performed by: INTERNAL MEDICINE

## 2024-10-11 PROCEDURE — 1123F ACP DISCUSS/DSCN MKR DOCD: CPT | Performed by: INTERNAL MEDICINE

## 2024-10-11 PROCEDURE — 3075F SYST BP GE 130 - 139MM HG: CPT | Performed by: INTERNAL MEDICINE

## 2024-10-11 RX ORDER — IRBESARTAN AND HYDROCHLOROTHIAZIDE 300; 12.5 MG/1; MG/1
1 TABLET, FILM COATED ORAL DAILY
Qty: 90 TABLET | Refills: 4 | Status: SHIPPED | OUTPATIENT
Start: 2024-10-11

## 2024-10-11 RX ORDER — IRBESARTAN AND HYDROCHLOROTHIAZIDE 300; 12.5 MG/1; MG/1
1 TABLET, FILM COATED ORAL DAILY
COMMUNITY
Start: 2024-08-25 | End: 2024-10-11 | Stop reason: SDUPTHER

## 2024-10-11 SDOH — ECONOMIC STABILITY: FOOD INSECURITY: WITHIN THE PAST 12 MONTHS, YOU WORRIED THAT YOUR FOOD WOULD RUN OUT BEFORE YOU GOT MONEY TO BUY MORE.: NEVER TRUE

## 2024-10-11 SDOH — ECONOMIC STABILITY: FOOD INSECURITY: WITHIN THE PAST 12 MONTHS, THE FOOD YOU BOUGHT JUST DIDN'T LAST AND YOU DIDN'T HAVE MONEY TO GET MORE.: NEVER TRUE

## 2024-10-11 SDOH — ECONOMIC STABILITY: INCOME INSECURITY: HOW HARD IS IT FOR YOU TO PAY FOR THE VERY BASICS LIKE FOOD, HOUSING, MEDICAL CARE, AND HEATING?: NOT HARD AT ALL

## 2024-10-11 ASSESSMENT — PATIENT HEALTH QUESTIONNAIRE - PHQ9
SUM OF ALL RESPONSES TO PHQ QUESTIONS 1-9: 0
SUM OF ALL RESPONSES TO PHQ9 QUESTIONS 1 & 2: 0
2. FEELING DOWN, DEPRESSED OR HOPELESS: NOT AT ALL
1. LITTLE INTEREST OR PLEASURE IN DOING THINGS: NOT AT ALL
SUM OF ALL RESPONSES TO PHQ QUESTIONS 1-9: 0

## 2024-10-11 NOTE — PROGRESS NOTES
A/P:    1. Type 2 diabetes mellitus without complication, without long-term current use of Previously well-controlled on current regimen, with A1C of 6.4%, by report. She is not on a statin, and we briefly discussed the benefits of statin therapy in diabetic patients. I will recommend continuing current medications and diabetic diet, along with regular exercise, likely statin therapy, pending labs today. Dilated eye exam is up to date.   - CBC with Auto Differential; Future  - Lipid Panel; Future  - Comprehensive Metabolic Panel; Future  - Microalbumin / Creatinine Urine Ratio; Future  - Hemoglobin A1C; Future    2. Primary hypertension  Stable. Controlled on irbesartan/hydrochlorothiazide on repeat today. Continue current regimen, pending labs.   - CBC with Auto Differential; Future  - Lipid Panel; Future  - Comprehensive Metabolic Panel; Future  - irbesartan-hydroCHLOROthiazide (AVALIDE) 300-12.5 MG per tablet; Take 1 tablet by mouth daily  Dispense: 90 tablet; Refill: 4    3. Chronic midline low back pain without sciatica  No radicular symptoms reported. She should start PT now and follow up if not improved.  - [facility] - Physical Therapy at [facility]       Follow up: 4 months  AWV        An electronic signature was used to authenticate this note.    --Miriam Khan MD         HPI: Salma Truong is here to establish a new PCP.     HTN: She is taking irbesartan/hctz for blood pressure daily without side effects. Denies exertional chest pain, dyspnea, significant leg swelling, dizziness. Exercises only intermittently.    Diabetes: Ms. Truong has had diabetes for about ten years. She has been taking metformin consistently. Last dilated eye exam was a couple months ago. Her A1C was 6.4%. She is fasting for labs today. She has never been on statin medication.     Now she is constantly sitting all day, gets stiff with this. There is pain in her buttocks, in the middle, started in the last two years.

## 2024-10-14 DIAGNOSIS — D64.9 NORMOCYTIC ANEMIA: Primary | ICD-10-CM

## 2024-10-14 LAB
FERRITIN SERPL-MCNC: 97 NG/ML (ref 8–252)
HAPTOGLOB SERPL-MCNC: 225 MG/DL (ref 30–200)
IRON SATN MFR SERPL: 18 % (ref 20–50)
IRON SERPL-MCNC: 53 UG/DL (ref 35–150)
LDH SERPL L TO P-CCNC: 244 U/L (ref 81–246)
TIBC SERPL-MCNC: 295 UG/DL (ref 250–450)

## 2024-10-31 ENCOUNTER — HOSPITAL ENCOUNTER (OUTPATIENT)
Facility: HOSPITAL | Age: 74
Setting detail: RECURRING SERIES
Discharge: HOME | End: 2024-11-03
Payer: MEDICARE

## 2024-10-31 PROCEDURE — 97110 THERAPEUTIC EXERCISES: CPT

## 2024-10-31 PROCEDURE — 97535 SELF CARE MNGMENT TRAINING: CPT

## 2024-10-31 PROCEDURE — 97162 PT EVAL MOD COMPLEX 30 MIN: CPT

## 2024-10-31 NOTE — THERAPY EVALUATION
Physical Therapy at [place],   a part of [facility]  [address], [address]  [place] [address]  Phone: [phone]  Fax: [phone]       PHYSICAL THERAPY - MEDICARE EVALUATION/PLAN OF CARE NOTE (updated 3/23)      Date: 10/31/2024          Patient Name:  Salma Truong :  1950   Medical   Diagnosis:  Chronic midline low back pain without sciatica [M54.50, G89.29] Treatment Diagnosis:  M25.511  RIGHT SHOULDER PAIN  and M54.59  OTHER LOWER BACK PAIN    Referral Source:  Miriam Khan MD Provider #:  [id number]                Insurance: Payor: [facility] MEDICARE / Plan: [facility] MEDICARE ADVANTAGE / Product Type: *No Product type* /      Patient  verified yes     Visit #   Current  / Total 1 12   Time   In / Out 3:15 p 4:15 p   Total Treatment Time 60   Total Timed Codes 25   1:1 Treatment Time 25    [facility] Totals Reminder:  bill using total billable   min of TIMED therapeutic procedures and modalities.   8-22 min = 1 unit; 23-37 min = 2 units; 38-52 min = 3 units;  53-67 min = 4 units; 68-82 min = 5 units           SUBJECTIVE  Pain Level (0-10 scale): Current- 0, Best- 0, Worst- 10    []constant []intermittent []improving []worsening []no change since onset    Any medication changes, allergies to medications, adverse drug reactions, diagnosis change, or new procedure performed?: [x] No    [] Yes (see summary sheet for update)  Medications: Verified on Patient Summary List    Subjective functional status/changes:       Start of Care: 10/31/2024  Onset Date:     Current symptoms/Complaints: LBP and chronic R>L shoulder pain.  Her arms can bother her when she sleeps on that side and she feels stiff.  She was previously diagnosed with R RC pathology and told that she either needs surgery or recurring injections.  She gets buttock and R hip pain when sitting for too long at work, about 1.5 hours, or when walking for more than 10 minutes.  She  interest, return verbalization , and return demonstration   Persons(s) to be included in education: patient (P)  Barriers to Learning/Limitations: none  Measures taken if barriers to learning present: NA  Patient Self Reported Health Status: good  Rehabilitation Potential: good    Short Term Goals: To be accomplished in 4 treatments.  Patient will be independent with initial HEP in order to transition to general wellness program.  Patient will report worst pain level of 8/10 or better to improve tolerance for sleeping through the night.   The patient will demonstrate lumbar rotation AROM to 25% restriction or better to improve ease with driving and reaching.       Long Term Goals: To be accomplished in 12 treatments.  1.Patient will report worst pain no greater than 3/10 to increase QOL and allow for independence with all hygienic self-care and ADL skills   2.Patient will demonstrate full lumbar AROM WFL to allow for reaching skill without imposed restrictions   3.Patient will demonstrate 5/5 BLE strength to allow for greater ease when maneuvering the stairs within her home.       Frequency / Duration: Patient recommended to be seen at least 1x/week, due to scheduling restriction will plan to be seen 1x/2weeks for up to 12 treatments.      Patient/ Caregiver education and instruction: Diagnosis, prognosis, self care, activity modification, and exercises   [x]  Plan of care has been reviewed with PTA      Certification Period: 90 days       Torrey Arce, PT, DPT       10/31/2024       3:14 PM        ===================================================================  I certify that the above Therapy Services are being furnished while the patient is under my care. I agree with the treatment plan and certify that this therapy is necessary.    Physician's Signature:_________________________   DATE:_________   TIME:________                           Miriam Khan MD    ** Signature, Date and Time must be

## 2024-11-12 ENCOUNTER — APPOINTMENT (OUTPATIENT)
Facility: HOSPITAL | Age: 74
End: 2024-11-12
Payer: MEDICARE

## 2024-11-25 ENCOUNTER — HOSPITAL ENCOUNTER (OUTPATIENT)
Facility: HOSPITAL | Age: 74
Setting detail: RECURRING SERIES
Discharge: HOME | End: 2024-11-28
Payer: MEDICARE

## 2024-11-25 PROCEDURE — 97110 THERAPEUTIC EXERCISES: CPT

## 2024-11-25 NOTE — PROGRESS NOTES
PHYSICAL THERAPY - MEDICARE DAILY TREATMENT NOTE (updated 3/23)      Date: 2024          Patient Name:  Salma Truong :  1950   Medical   Diagnosis:  Chronic midline low back pain without sciatica [M54.50, G89.29] Treatment Diagnosis:  M25.511  RIGHT SHOULDER PAIN  and M54.59  OTHER LOWER BACK PAIN    Referral Source:  Miriam Khan MD Insurance:   Payor: [facility] MEDICARE / Plan: [facility] MEDICARE ADVANTAGE / Product Type: *No Product type* /                     Patient  verified YES    Visit #   Current  / Total 2 12   Time   In / Out 415p 455p   Total Treatment Time 40   Total Timed Codes 40   1:1 Treatment Time 40      [facility] Totals Reminder:  bill using total billable   min of TIMED therapeutic procedures and modalities.   8-22 min = 1 unit; 23-37 min = 2 units; 38-52 min = 3 units; 53-67 min = 4 units; 68-82 min = 5 units            SUBJECTIVE    Pain Level (0-10 scale): 0/10 low back 3/10 R shoulder    Any medication changes, allergies to medications, adverse drug reactions, diagnosis change, or new procedure performed?: [x] No    [] Yes (see summary sheet for update)  Medications: Verified on Patient Summary List    Subjective functional status/changes:     Patient reported only has pain with prolong sitting and prolong walking.    OBJECTIVE      Therapeutic Procedures:  Tx Min Billable or 1:1 Min (if diff from Tx Min) Procedure, Rationale, Specifics   40  64540 Therapeutic Exercise (timed):  increase ROM, strength, coordination, balance, and proprioception to improve patient's ability to progress to PLOF and address remaining functional goals. (see flow sheet as applicable)     Details if applicable:     40     Total Total       [x]  Patient Education billed concurrently with other procedures   [x] Review HEP    [] Progressed/Changed HEP, detail:    [] Other detail:         Other Objective/Functional Measures  Mod fatigue and vcs for proper form with standing therex    Educated on

## 2024-12-09 ENCOUNTER — HOSPITAL ENCOUNTER (OUTPATIENT)
Facility: HOSPITAL | Age: 74
Setting detail: RECURRING SERIES
Discharge: HOME | End: 2024-12-12
Payer: MEDICARE

## 2024-12-09 PROCEDURE — 97110 THERAPEUTIC EXERCISES: CPT

## 2024-12-09 NOTE — PROGRESS NOTES
proper form with standing therex    Mod fatigue with UE strengthening but no increase in pain present.  Pain Level at end of session (0-10 scale): 0/10      Assessment     Patient will continue to benefit from skilled PT / OT services to modify and progress therapeutic interventions, analyze and address functional mobility deficits, analyze and address ROM deficits, analyze and address strength deficits, analyze and address soft tissue restrictions, analyze and cue for proper movement patterns, and analyze and modify for postural abnormalities to address functional deficits and attain remaining goals.    Progress toward goals / Updated goals:  []  See Progress Note/Recertification    Updated HEP      PLAN  YES Continue plan of care  Re-Cert Due: 1/27/25  [x]  Upgrade activities as tolerated  []  Discharge due to :  []  Other:      THANH WHITE, PTA       12/9/2024       4:20 PM

## 2024-12-30 ENCOUNTER — HOSPITAL ENCOUNTER (OUTPATIENT)
Facility: HOSPITAL | Age: 74
Setting detail: RECURRING SERIES
Discharge: HOME | End: 2025-01-02
Payer: MEDICARE

## 2024-12-30 PROCEDURE — 97140 MANUAL THERAPY 1/> REGIONS: CPT

## 2024-12-30 PROCEDURE — 97110 THERAPEUTIC EXERCISES: CPT

## 2024-12-30 NOTE — PROGRESS NOTES
PHYSICAL THERAPY - MEDICARE DAILY TREATMENT NOTE (updated 3/23)      Date: 2024          Patient Name:  Salma Truong :  1950   Medical   Diagnosis:  Chronic midline low back pain without sciatica [M54.50, G89.29] Treatment Diagnosis:  M25.511  RIGHT SHOULDER PAIN  and M54.59  OTHER LOWER BACK PAIN    Referral Source:  Miriam Khan MD Insurance:   Payor: [facility] MEDICARE / Plan: [facility] MEDICARE ADVANTAGE / Product Type: *No Product type* /                     Patient  verified YES    Visit #   Current  / Total 4 12   Time   In / Out 1:15p 2:15p   Total Treatment Time 60   Total Timed Codes 45   1:1 Treatment Time 45      [facility] Totals Reminder:  bill using total billable   min of TIMED therapeutic procedures and modalities.   8-22 min = 1 unit; 23-37 min = 2 units; 38-52 min = 3 units; 53-67 min = 4 units; 68-82 min = 5 units        2TE, 1MT    SUBJECTIVE    Pain Level (0-10 scale): 0/10    Any medication changes, allergies to medications, adverse drug reactions, diagnosis change, or new procedure performed?: [x] No    [] Yes (see summary sheet for update)  Medications: Verified on Patient Summary List    Subjective functional status/changes:     Pt reported pain in shoulders when reaching for household items in above head cabinets.  Pain can reach a 5/10 level when doing so but patient can push through.  Patient had experienced pain in shoulder after sleeping on side.    OBJECTIVE      Therapeutic Procedures:  Tx Min Billable or 1:1 Min (if diff from Tx Min) Procedure, Rationale, Specifics   35  82432 Therapeutic Exercise (timed):  increase ROM, strength, coordination, balance, and proprioception to improve patient's ability to progress to PLOF and address remaining functional goals. (see flow sheet as applicable)     Details if applicable:     10  29265 Manual Therapy (timed):  decrease pain and increase ROM to improve patient's ability to progress to PLOF and address remaining functional

## 2025-02-07 ENCOUNTER — TELEPHONE (OUTPATIENT)
Facility: CLINIC | Age: 75
End: 2025-02-07

## 2025-02-07 SDOH — HEALTH STABILITY: PHYSICAL HEALTH: ON AVERAGE, HOW MANY MINUTES DO YOU ENGAGE IN EXERCISE AT THIS LEVEL?: 20 MIN

## 2025-02-07 SDOH — HEALTH STABILITY: PHYSICAL HEALTH: ON AVERAGE, HOW MANY DAYS PER WEEK DO YOU ENGAGE IN MODERATE TO STRENUOUS EXERCISE (LIKE A BRISK WALK)?: 7 DAYS

## 2025-02-07 ASSESSMENT — PATIENT HEALTH QUESTIONNAIRE - PHQ9
2. FEELING DOWN, DEPRESSED OR HOPELESS: NOT AT ALL
SUM OF ALL RESPONSES TO PHQ QUESTIONS 1-9: 0
1. LITTLE INTEREST OR PLEASURE IN DOING THINGS: NOT AT ALL
SUM OF ALL RESPONSES TO PHQ QUESTIONS 1-9: 0

## 2025-02-07 ASSESSMENT — LIFESTYLE VARIABLES
HOW OFTEN DO YOU HAVE A DRINK CONTAINING ALCOHOL: MONTHLY OR LESS
HOW MANY STANDARD DRINKS CONTAINING ALCOHOL DO YOU HAVE ON A TYPICAL DAY: 1 OR 2

## 2025-03-03 ENCOUNTER — TELEPHONE (OUTPATIENT)
Facility: CLINIC | Age: 75
End: 2025-03-03

## 2025-03-03 SDOH — ECONOMIC STABILITY: FOOD INSECURITY: WITHIN THE PAST 12 MONTHS, THE FOOD YOU BOUGHT JUST DIDN'T LAST AND YOU DIDN'T HAVE MONEY TO GET MORE.: OFTEN TRUE

## 2025-03-03 SDOH — ECONOMIC STABILITY: FOOD INSECURITY: WITHIN THE PAST 12 MONTHS, YOU WORRIED THAT YOUR FOOD WOULD RUN OUT BEFORE YOU GOT MONEY TO BUY MORE.: OFTEN TRUE

## 2025-03-03 SDOH — HEALTH STABILITY: PHYSICAL HEALTH: ON AVERAGE, HOW MANY DAYS PER WEEK DO YOU ENGAGE IN MODERATE TO STRENUOUS EXERCISE (LIKE A BRISK WALK)?: 7 DAYS

## 2025-03-03 SDOH — HEALTH STABILITY: PHYSICAL HEALTH: ON AVERAGE, HOW MANY MINUTES DO YOU ENGAGE IN EXERCISE AT THIS LEVEL?: 20 MIN

## 2025-03-03 SDOH — ECONOMIC STABILITY: TRANSPORTATION INSECURITY
IN THE PAST 12 MONTHS, HAS THE LACK OF TRANSPORTATION KEPT YOU FROM MEDICAL APPOINTMENTS OR FROM GETTING MEDICATIONS?: NO

## 2025-03-03 SDOH — ECONOMIC STABILITY: INCOME INSECURITY: IN THE LAST 12 MONTHS, WAS THERE A TIME WHEN YOU WERE NOT ABLE TO PAY THE MORTGAGE OR RENT ON TIME?: NO

## 2025-03-03 SDOH — ECONOMIC STABILITY: TRANSPORTATION INSECURITY
IN THE PAST 12 MONTHS, HAS LACK OF TRANSPORTATION KEPT YOU FROM MEETINGS, WORK, OR FROM GETTING THINGS NEEDED FOR DAILY LIVING?: NO

## 2025-03-03 ASSESSMENT — LIFESTYLE VARIABLES
HOW OFTEN DO YOU HAVE A DRINK CONTAINING ALCOHOL: MONTHLY OR LESS
HOW OFTEN DO YOU HAVE A DRINK CONTAINING ALCOHOL: 2
HOW OFTEN DO YOU HAVE SIX OR MORE DRINKS ON ONE OCCASION: 1
HOW MANY STANDARD DRINKS CONTAINING ALCOHOL DO YOU HAVE ON A TYPICAL DAY: 1
HOW MANY STANDARD DRINKS CONTAINING ALCOHOL DO YOU HAVE ON A TYPICAL DAY: 1 OR 2

## 2025-03-04 ENCOUNTER — OFFICE VISIT (OUTPATIENT)
Facility: CLINIC | Age: 75
End: 2025-03-04

## 2025-03-04 VITALS
WEIGHT: 203.2 LBS | SYSTOLIC BLOOD PRESSURE: 136 MMHG | DIASTOLIC BLOOD PRESSURE: 80 MMHG | BODY MASS INDEX: 36 KG/M2 | OXYGEN SATURATION: 97 % | HEART RATE: 71 BPM | HEIGHT: 63 IN

## 2025-03-04 DIAGNOSIS — I10 PRIMARY HYPERTENSION: ICD-10-CM

## 2025-03-04 DIAGNOSIS — D64.9 NORMOCYTIC ANEMIA: ICD-10-CM

## 2025-03-04 DIAGNOSIS — M85.89 OTHER SPECIFIED DISORDERS OF BONE DENSITY AND STRUCTURE, MULTIPLE SITES: ICD-10-CM

## 2025-03-04 DIAGNOSIS — Z00.00 MEDICARE ANNUAL WELLNESS VISIT, SUBSEQUENT: Primary | ICD-10-CM

## 2025-03-04 DIAGNOSIS — E11.9 TYPE 2 DIABETES MELLITUS WITHOUT COMPLICATION, WITHOUT LONG-TERM CURRENT USE OF INSULIN (HCC): ICD-10-CM

## 2025-03-04 DIAGNOSIS — Z12.31 ENCOUNTER FOR SCREENING MAMMOGRAM FOR BREAST CANCER: ICD-10-CM

## 2025-03-04 NOTE — PROGRESS NOTES
Medicare Annual Wellness Visit    Salma Truong is here for Medicare AWV    Assessment & Plan     1. Medicare annual wellness visit, subsequent  We discussed the benefits of advanced care planning today. I gave the patient an information packet and instructions for instituting an advanced medical directive. Most immunizations are up to date; Prevnar, Shingrix, COVID recommended. Colonoscopy is due again in 2027, if recommended. Continue to follow up here every six months, at least, and with specialists as directed.     2. Encounter for screening mammogram for breast cancer  She declines mammogram again.    3. Other specified disorders of bone density and structure, multiple sites  She should have a bone density test to screen for osteoporosis and fracture risk. Will make specific recommendations after the test.   - DEXA BONE DENSITY AXIAL SKELETON; Future    4. Normocytic anemia  Chronic, possible anemia of chronic disease.  Will have labs to follow today.  - CBC with Auto Differential; Future  - PILAR and PE, Serum; Future  - Ferritin; Future  - Haptoglobin; Future  - Lactate Dehydrogenase; Future  - Reticulocytes; Future    5. Type 2 diabetes mellitus without complication, without long-term current use of insulin (HCC)  Previously well-controlled on metformin and diet.  Will obtain labs today to follow.  Dilated eye exam is up-to-date.  We discussed benefits of statin therapy for diabetic patients.  LDL goal is less than 70.  She is very resistant to the idea of statin.  We also discussed possibly getting a coronary calcium score, if that would help motivate her to take a statin for cardiovascular risk reduction.  She will consider this and contact her insurance company to see if it would be covered.  - Hemoglobin A1C; Future  - Basic Metabolic Panel; Future  -  DIABETES FOOT EXAM    6. Primary hypertension  Controlled, on repeat.  Continue irbesartan/hydrochlorothiazide, pending labs.  - Basic Metabolic

## 2025-03-04 NOTE — PATIENT INSTRUCTIONS
they won't have to make tough decisions by themselves.  For more information, including forms for your state, see the [employer] website (www.caringinfo.org/planning/advance-directives/).  Follow-up care is a key part of your treatment and safety. Be sure to make and go to all appointments, and call your doctor if you are having problems. It's also a good idea to know your test results and keep a list of the medicines you take.  What should you include in an advance directive?  Many states have a unique advance directive form. (It may ask you to address specific issues.) Or you might use a universal form that's approved by many states.  If your form doesn't tell you what to address, it may be hard to know what to include in your advance directive. Use the questions below to help you get started.  Who do you want to make decisions about your medical care if you are not able to?  What life-support measures do you want if you have a serious illness that gets worse over time or can't be cured?  What are you most afraid of that might happen? (Maybe you're afraid of having pain, losing your independence, or being kept alive by machines.)  Where would you prefer to die? (Your home? A hospital? A nursing home?)  Do you want to donate your organs when you die?  Do you want certain Jewish practices performed before you die?  When should you call for help?  Be sure to contact your doctor if you have any questions.  Where can you learn more?  Go to https://www.[employer].net/patientEd and enter R264 to learn more about \"Advance Directives: Care Instructions.\"  Current as of: November 16, 2023  Content Version: 14.3  © 2024 [employer].   Care instructions adapted under license by [employer]. If you have questions about a medical condition or this instruction, always ask your healthcare professional. "[employer], disclaims any warranty or liability for your use of this information.         [employer]

## 2025-03-05 LAB
ANION GAP SERPL CALC-SCNC: 3 MMOL/L (ref 2–12)
BASOPHILS # BLD: 0.03 K/UL (ref 0–0.1)
BASOPHILS NFR BLD: 0.6 % (ref 0–1)
BUN SERPL-MCNC: 13 MG/DL (ref 6–20)
BUN/CREAT SERPL: 14 (ref 12–20)
CALCIUM SERPL-MCNC: 9.9 MG/DL (ref 8.5–10.1)
CHLORIDE SERPL-SCNC: 109 MMOL/L (ref 97–108)
CO2 SERPL-SCNC: 28 MMOL/L (ref 21–32)
CREAT SERPL-MCNC: 0.94 MG/DL (ref 0.55–1.02)
DIFFERENTIAL METHOD BLD: ABNORMAL
EOSINOPHIL # BLD: 0.1 K/UL (ref 0–0.4)
EOSINOPHIL NFR BLD: 2.1 % (ref 0–7)
ERYTHROCYTE [DISTWIDTH] IN BLOOD BY AUTOMATED COUNT: 13.7 % (ref 11.5–14.5)
EST. AVERAGE GLUCOSE BLD GHB EST-MCNC: 131 MG/DL
FERRITIN SERPL-MCNC: 70 NG/ML (ref 8–252)
GLUCOSE SERPL-MCNC: 100 MG/DL (ref 65–100)
HAPTOGLOB SERPL-MCNC: 173 MG/DL (ref 30–200)
HBA1C MFR BLD: 6.2 % (ref 4–5.6)
HCT VFR BLD AUTO: 35.1 % (ref 35–47)
HGB BLD-MCNC: 11.2 G/DL (ref 11.5–16)
IMM GRANULOCYTES # BLD AUTO: 0.01 K/UL (ref 0–0.04)
IMM GRANULOCYTES NFR BLD AUTO: 0.2 % (ref 0–0.5)
LDH SERPL L TO P-CCNC: 202 U/L (ref 81–246)
LYMPHOCYTES # BLD: 2.19 K/UL (ref 0.8–3.5)
LYMPHOCYTES NFR BLD: 45 % (ref 12–49)
MCH RBC QN AUTO: 28 PG (ref 26–34)
MCHC RBC AUTO-ENTMCNC: 31.9 G/DL (ref 30–36.5)
MCV RBC AUTO: 87.8 FL (ref 80–99)
MONOCYTES # BLD: 0.33 K/UL (ref 0–1)
MONOCYTES NFR BLD: 6.8 % (ref 5–13)
NEUTS SEG # BLD: 2.21 K/UL (ref 1.8–8)
NEUTS SEG NFR BLD: 45.3 % (ref 32–75)
NRBC # BLD: 0 K/UL (ref 0–0.01)
NRBC BLD-RTO: 0 PER 100 WBC
PLATELET # BLD AUTO: 180 K/UL (ref 150–400)
PMV BLD AUTO: 13 FL (ref 8.9–12.9)
POTASSIUM SERPL-SCNC: 4.4 MMOL/L (ref 3.5–5.1)
RBC # BLD AUTO: 4 M/UL (ref 3.8–5.2)
RETICS # AUTO: 0.06 M/UL (ref 0.02–0.08)
RETICS/RBC NFR AUTO: 1.4 % (ref 0.7–2.1)
SODIUM SERPL-SCNC: 140 MMOL/L (ref 136–145)
WBC # BLD AUTO: 4.9 K/UL (ref 3.6–11)

## 2025-03-11 LAB
ALBUMIN SERPL ELPH-MCNC: 3.8 G/DL (ref 2.9–4.4)
ALBUMIN/GLOB SERPL: 1.1 (ref 0.7–1.7)
ALPHA1 GLOB SERPL ELPH-MCNC: 0.2 G/DL (ref 0–0.4)
ALPHA2 GLOB SERPL ELPH-MCNC: 0.9 G/DL (ref 0.4–1)
B-GLOBULIN SERPL ELPH-MCNC: 1.1 G/DL (ref 0.7–1.3)
GAMMA GLOB SERPL ELPH-MCNC: 1.3 G/DL (ref 0.4–1.8)
GLOBULIN SER-MCNC: 3.5 G/DL (ref 2.2–3.9)
IGA SERPL-MCNC: 149 MG/DL (ref 64–422)
IGG SERPL-MCNC: 1159 MG/DL (ref 586–1602)
IGM SERPL-MCNC: 269 MG/DL (ref 26–217)
INTERPRETATION SERPL IEP-IMP: ABNORMAL
M PROTEIN SERPL ELPH-MCNC: ABNORMAL G/DL
PROT SERPL-MCNC: 7.3 G/DL (ref 6–8.5)

## 2025-03-13 ENCOUNTER — RESULTS FOLLOW-UP (OUTPATIENT)
Facility: CLINIC | Age: 75
End: 2025-03-13

## 2025-05-05 ENCOUNTER — TELEPHONE (OUTPATIENT)
Facility: CLINIC | Age: 75
End: 2025-05-05

## 2025-05-05 NOTE — TELEPHONE ENCOUNTER
Patient called needing a medication refill but also stated based of her lab results does she need antibiotics    metFORMIN (GLUCOPHAGE-XR) 500 MG extended release tablet     [id number] - [place] - [address] -  [phone] - f [phone] [[id number]]

## 2025-05-06 RX ORDER — METFORMIN HYDROCHLORIDE 500 MG/1
500 TABLET, EXTENDED RELEASE ORAL 2 TIMES DAILY
Qty: 180 TABLET | Refills: 3 | Status: SHIPPED | OUTPATIENT
Start: 2025-05-06

## 2025-05-06 RX ORDER — METFORMIN HYDROCHLORIDE 500 MG/1
500 TABLET, EXTENDED RELEASE ORAL 2 TIMES DAILY
Qty: 180 TABLET | Refills: 1 | Status: CANCELLED | OUTPATIENT
Start: 2025-05-06 | End: 2025-11-02

## 2025-05-06 NOTE — TELEPHONE ENCOUNTER
I am not sure which results she is asking about, and why she would need antibiotics. We did labs in March.     Prescription for metformin sent.

## 2025-05-06 NOTE — PROGRESS NOTES
PCP: Miriam Khan MD    Last Appointment: 3/4/2025    Future Appointments   Date Time Provider Department Center   6/20/2025 11:30 AM Miriam Khan MD [facility] DEP       Requested Prescriptions     Pending Prescriptions Disp Refills    metFORMIN (GLUCOPHAGE-XR) 500 MG extended release tablet 180 tablet 1     Sig: Take 1 tablet by mouth 2 times daily       LAST ORDERED: 5/25/2023      Annalisa \"Osseo\" ADE Ulrich

## 2025-06-20 ENCOUNTER — OFFICE VISIT (OUTPATIENT)
Facility: CLINIC | Age: 75
End: 2025-06-20
Payer: MEDICARE

## 2025-06-20 VITALS
HEART RATE: 65 BPM | HEIGHT: 63 IN | DIASTOLIC BLOOD PRESSURE: 76 MMHG | BODY MASS INDEX: 36.68 KG/M2 | WEIGHT: 207 LBS | SYSTOLIC BLOOD PRESSURE: 136 MMHG | OXYGEN SATURATION: 98 %

## 2025-06-20 DIAGNOSIS — I10 PRIMARY HYPERTENSION: Primary | ICD-10-CM

## 2025-06-20 DIAGNOSIS — M25.531 RIGHT WRIST PAIN: ICD-10-CM

## 2025-06-20 PROCEDURE — 1126F AMNT PAIN NOTED NONE PRSNT: CPT | Performed by: INTERNAL MEDICINE

## 2025-06-20 PROCEDURE — 1123F ACP DISCUSS/DSCN MKR DOCD: CPT | Performed by: INTERNAL MEDICINE

## 2025-06-20 PROCEDURE — 1160F RVW MEDS BY RX/DR IN RCRD: CPT | Performed by: INTERNAL MEDICINE

## 2025-06-20 PROCEDURE — 1159F MED LIST DOCD IN RCRD: CPT | Performed by: INTERNAL MEDICINE

## 2025-06-20 PROCEDURE — 3075F SYST BP GE 130 - 139MM HG: CPT | Performed by: INTERNAL MEDICINE

## 2025-06-20 PROCEDURE — 3078F DIAST BP <80 MM HG: CPT | Performed by: INTERNAL MEDICINE

## 2025-06-20 PROCEDURE — 99213 OFFICE O/P EST LOW 20 MIN: CPT | Performed by: INTERNAL MEDICINE

## 2025-06-20 NOTE — PROGRESS NOTES
A/P:          ICD-10-CM    1. Primary hypertension  I10       2. Right wrist pain  M25.531            Assessment & Plan  1. Right wrist pain: Chronic, possible DeQuervain's tenosynovitis.  - Discussed the possibility of consulting an orthopedic hand specialist for further evaluation and potential injection therapy. She prefers to monitor for now.   - Informed about the temporary impact of injection therapy on blood sugar levels.    2. Hypertension.  - Blood pressure reading was 136/76 mmHg upon re-evaluation.  - Advised to continue regular monitoring of blood pressure.  - Encouraged to maintain the current medication regimen--irbesartan/hydrochlorothiazide.               Follow up: 3 months        An electronic signature was used to authenticate this note.    --Miriam Khan MD         HPI: Salma Truong is here for a follow up visit:      History of Present Illness  The patient is a 75-year-old woman here for follow-up.    She reports experiencing intermittent pain in her right wrist, which she attributes to her work activities. This pain has been present for approximately 2 to 3 years and is exacerbated by work but alleviated with rest. She does not experience any associated numbness or tingling. Occasionally, she experiences a locking sensation in her fingers and notes that the vein in her wrist becomes prominent at times. She manages the pain with a compression band and topical cream but does not take any oral medications for it.    She engages in regular exercise, including leg stretches and strengthening exercises, and expresses an interest in water aerobics. She also walks around her dining room table 10 times daily, although she has not done so recently. She has gained 4 pounds.    She is compliant with her daily blood pressure medication regimen. She reports no lower extremity swelling or chest pain during physical activity. No significant or limiting exertional shortness of breath. She does not

## (undated) DEVICE — TUBING HYDR IRR --

## (undated) DEVICE — FORCEPS BX L240CM JAW DIA2.8MM L CAP W/ NDL MIC MESH TOOTH

## (undated) DEVICE — TRAP SURG QUAD PARABOLA SLOT DSGN SFTY SCRN TRAPEASE

## (undated) DEVICE — SNARE SURG 2.4 MMX230 CM EXACTO CLD